# Patient Record
Sex: MALE | HISPANIC OR LATINO | Employment: FULL TIME | URBAN - METROPOLITAN AREA
[De-identification: names, ages, dates, MRNs, and addresses within clinical notes are randomized per-mention and may not be internally consistent; named-entity substitution may affect disease eponyms.]

---

## 2021-11-30 ENCOUNTER — APPOINTMENT (OUTPATIENT)
Dept: RADIOLOGY | Facility: MEDICAL CENTER | Age: 51
End: 2021-11-30
Attending: EMERGENCY MEDICINE
Payer: COMMERCIAL

## 2021-11-30 ENCOUNTER — HOSPITAL ENCOUNTER (EMERGENCY)
Facility: MEDICAL CENTER | Age: 51
End: 2021-11-30
Attending: EMERGENCY MEDICINE
Payer: COMMERCIAL

## 2021-11-30 VITALS
BODY MASS INDEX: 28.57 KG/M2 | DIASTOLIC BLOOD PRESSURE: 67 MMHG | SYSTOLIC BLOOD PRESSURE: 136 MMHG | RESPIRATION RATE: 18 BRPM | HEIGHT: 68 IN | TEMPERATURE: 98 F | WEIGHT: 188.49 LBS | OXYGEN SATURATION: 96 % | HEART RATE: 107 BPM

## 2021-11-30 DIAGNOSIS — R11.2 NAUSEA VOMITING AND DIARRHEA: ICD-10-CM

## 2021-11-30 DIAGNOSIS — E11.65 CONTROLLED TYPE 2 DIABETES MELLITUS WITH HYPERGLYCEMIA, WITHOUT LONG-TERM CURRENT USE OF INSULIN (HCC): ICD-10-CM

## 2021-11-30 DIAGNOSIS — E86.0 DEHYDRATION: ICD-10-CM

## 2021-11-30 DIAGNOSIS — R19.7 NAUSEA VOMITING AND DIARRHEA: ICD-10-CM

## 2021-11-30 DIAGNOSIS — K52.9 ENTERITIS: ICD-10-CM

## 2021-11-30 LAB
ALBUMIN SERPL BCP-MCNC: 4.8 G/DL (ref 3.2–4.9)
ALBUMIN/GLOB SERPL: 1.6 G/DL
ALP SERPL-CCNC: 77 U/L (ref 30–99)
ALT SERPL-CCNC: 33 U/L (ref 2–50)
ANION GAP SERPL CALC-SCNC: 21 MMOL/L (ref 7–16)
APPEARANCE UR: CLEAR
AST SERPL-CCNC: 28 U/L (ref 12–45)
BACTERIA #/AREA URNS HPF: ABNORMAL /HPF
BASE EXCESS BLDV CALC-SCNC: -5 MMOL/L
BASOPHILS # BLD AUTO: 0.3 % (ref 0–1.8)
BASOPHILS # BLD: 0.02 K/UL (ref 0–0.12)
BILIRUB SERPL-MCNC: 0.8 MG/DL (ref 0.1–1.5)
BILIRUB UR QL STRIP.AUTO: NEGATIVE
BODY TEMPERATURE: ABNORMAL CENTIGRADE
BUN SERPL-MCNC: 18 MG/DL (ref 8–22)
CALCIUM SERPL-MCNC: 9.2 MG/DL (ref 8.4–10.2)
CHLORIDE SERPL-SCNC: 102 MMOL/L (ref 96–112)
CO2 SERPL-SCNC: 15 MMOL/L (ref 20–33)
COLOR UR: YELLOW
CREAT SERPL-MCNC: 0.83 MG/DL (ref 0.5–1.4)
EKG IMPRESSION: NORMAL
EOSINOPHIL # BLD AUTO: 0.03 K/UL (ref 0–0.51)
EOSINOPHIL NFR BLD: 0.4 % (ref 0–6.9)
EPI CELLS #/AREA URNS HPF: ABNORMAL /HPF
ERYTHROCYTE [DISTWIDTH] IN BLOOD BY AUTOMATED COUNT: 39.4 FL (ref 35.9–50)
GLOBULIN SER CALC-MCNC: 3 G/DL (ref 1.9–3.5)
GLUCOSE SERPL-MCNC: 199 MG/DL (ref 65–99)
GLUCOSE UR STRIP.AUTO-MCNC: >=1000 MG/DL
HCO3 BLDV-SCNC: 18 MMOL/L (ref 24–28)
HCT VFR BLD AUTO: 54.4 % (ref 42–52)
HGB BLD-MCNC: 18.7 G/DL (ref 14–18)
HYALINE CASTS #/AREA URNS LPF: ABNORMAL /LPF
IMM GRANULOCYTES # BLD AUTO: 0.03 K/UL (ref 0–0.11)
IMM GRANULOCYTES NFR BLD AUTO: 0.4 % (ref 0–0.9)
KETONES UR STRIP.AUTO-MCNC: NEGATIVE MG/DL
LACTATE BLD-SCNC: 1.9 MMOL/L (ref 0.5–2)
LEUKOCYTE ESTERASE UR QL STRIP.AUTO: NEGATIVE
LIPASE SERPL-CCNC: 25 U/L (ref 7–58)
LYMPHOCYTES # BLD AUTO: 0.71 K/UL (ref 1–4.8)
LYMPHOCYTES NFR BLD: 10.4 % (ref 22–41)
MCH RBC QN AUTO: 29.9 PG (ref 27–33)
MCHC RBC AUTO-ENTMCNC: 34.4 G/DL (ref 33.7–35.3)
MCV RBC AUTO: 87 FL (ref 81.4–97.8)
MICRO URNS: ABNORMAL
MONOCYTES # BLD AUTO: 0.12 K/UL (ref 0–0.85)
MONOCYTES NFR BLD AUTO: 1.8 % (ref 0–13.4)
MUCOUS THREADS #/AREA URNS HPF: ABNORMAL /HPF
NEUTROPHILS # BLD AUTO: 5.91 K/UL (ref 1.82–7.42)
NEUTROPHILS NFR BLD: 86.7 % (ref 44–72)
NITRITE UR QL STRIP.AUTO: NEGATIVE
NRBC # BLD AUTO: 0 K/UL
NRBC BLD-RTO: 0 /100 WBC
PCO2 BLDV: 31.2 MMHG (ref 41–51)
PH BLDV: 7.39 [PH] (ref 7.31–7.45)
PH UR STRIP.AUTO: 5 [PH] (ref 5–8)
PLATELET # BLD AUTO: 179 K/UL (ref 164–446)
PMV BLD AUTO: 11.2 FL (ref 9–12.9)
PO2 BLDV: 54.2 MMHG (ref 25–40)
POTASSIUM SERPL-SCNC: 4.8 MMOL/L (ref 3.6–5.5)
PROT SERPL-MCNC: 7.8 G/DL (ref 6–8.2)
PROT UR QL STRIP: 100 MG/DL
RBC # BLD AUTO: 6.25 M/UL (ref 4.7–6.1)
RBC # URNS HPF: ABNORMAL /HPF
RBC UR QL AUTO: ABNORMAL
SAO2 % BLDV: 89.2 %
SODIUM SERPL-SCNC: 138 MMOL/L (ref 135–145)
SP GR UR STRIP.AUTO: >=1.03
TROPONIN T SERPL-MCNC: 10 NG/L (ref 6–19)
WBC # BLD AUTO: 6.8 K/UL (ref 4.8–10.8)
WBC #/AREA URNS HPF: ABNORMAL /HPF

## 2021-11-30 PROCEDURE — 36415 COLL VENOUS BLD VENIPUNCTURE: CPT

## 2021-11-30 PROCEDURE — 99284 EMERGENCY DEPT VISIT MOD MDM: CPT

## 2021-11-30 PROCEDURE — 85025 COMPLETE CBC W/AUTO DIFF WBC: CPT

## 2021-11-30 PROCEDURE — 82803 BLOOD GASES ANY COMBINATION: CPT

## 2021-11-30 PROCEDURE — 96375 TX/PRO/DX INJ NEW DRUG ADDON: CPT

## 2021-11-30 PROCEDURE — 74177 CT ABD & PELVIS W/CONTRAST: CPT

## 2021-11-30 PROCEDURE — 700117 HCHG RX CONTRAST REV CODE 255: Performed by: EMERGENCY MEDICINE

## 2021-11-30 PROCEDURE — 83690 ASSAY OF LIPASE: CPT

## 2021-11-30 PROCEDURE — 93005 ELECTROCARDIOGRAM TRACING: CPT

## 2021-11-30 PROCEDURE — 96374 THER/PROPH/DIAG INJ IV PUSH: CPT

## 2021-11-30 PROCEDURE — 81001 URINALYSIS AUTO W/SCOPE: CPT

## 2021-11-30 PROCEDURE — 83605 ASSAY OF LACTIC ACID: CPT

## 2021-11-30 PROCEDURE — 80053 COMPREHEN METABOLIC PANEL: CPT

## 2021-11-30 PROCEDURE — 96372 THER/PROPH/DIAG INJ SC/IM: CPT

## 2021-11-30 PROCEDURE — 84484 ASSAY OF TROPONIN QUANT: CPT

## 2021-11-30 PROCEDURE — 700105 HCHG RX REV CODE 258: Performed by: EMERGENCY MEDICINE

## 2021-11-30 PROCEDURE — 93005 ELECTROCARDIOGRAM TRACING: CPT | Performed by: EMERGENCY MEDICINE

## 2021-11-30 PROCEDURE — 700111 HCHG RX REV CODE 636 W/ 250 OVERRIDE (IP): Performed by: EMERGENCY MEDICINE

## 2021-11-30 RX ORDER — ONDANSETRON 4 MG/1
4 TABLET, ORALLY DISINTEGRATING ORAL EVERY 6 HOURS PRN
Qty: 10 TABLET | Refills: 0 | Status: SHIPPED | OUTPATIENT
Start: 2021-11-30

## 2021-11-30 RX ORDER — DICYCLOMINE HCL 20 MG
20 TABLET ORAL EVERY 6 HOURS PRN
Qty: 10 TABLET | Refills: 0 | Status: SHIPPED | OUTPATIENT
Start: 2021-11-30

## 2021-11-30 RX ORDER — DICYCLOMINE HYDROCHLORIDE 10 MG/ML
20 INJECTION INTRAMUSCULAR ONCE
Status: COMPLETED | OUTPATIENT
Start: 2021-11-30 | End: 2021-11-30

## 2021-11-30 RX ORDER — SODIUM CHLORIDE, SODIUM LACTATE, POTASSIUM CHLORIDE, CALCIUM CHLORIDE 600; 310; 30; 20 MG/100ML; MG/100ML; MG/100ML; MG/100ML
1000 INJECTION, SOLUTION INTRAVENOUS ONCE
Status: DISCONTINUED | OUTPATIENT
Start: 2021-11-30 | End: 2021-11-30

## 2021-11-30 RX ORDER — SODIUM CHLORIDE, SODIUM LACTATE, POTASSIUM CHLORIDE, CALCIUM CHLORIDE 600; 310; 30; 20 MG/100ML; MG/100ML; MG/100ML; MG/100ML
1000 INJECTION, SOLUTION INTRAVENOUS ONCE
Status: COMPLETED | OUTPATIENT
Start: 2021-11-30 | End: 2021-11-30

## 2021-11-30 RX ORDER — ONDANSETRON 2 MG/ML
4 INJECTION INTRAMUSCULAR; INTRAVENOUS ONCE
Status: COMPLETED | OUTPATIENT
Start: 2021-11-30 | End: 2021-11-30

## 2021-11-30 RX ADMIN — IOHEXOL 100 ML: 350 INJECTION, SOLUTION INTRAVENOUS at 04:59

## 2021-11-30 RX ADMIN — SODIUM CHLORIDE, POTASSIUM CHLORIDE, SODIUM LACTATE AND CALCIUM CHLORIDE 1000 ML: 600; 310; 30; 20 INJECTION, SOLUTION INTRAVENOUS at 03:58

## 2021-11-30 RX ADMIN — FAMOTIDINE 20 MG: 10 INJECTION INTRAVENOUS at 03:58

## 2021-11-30 RX ADMIN — DICYCLOMINE HYDROCHLORIDE 20 MG: 20 INJECTION, SOLUTION INTRAMUSCULAR at 03:57

## 2021-11-30 RX ADMIN — ONDANSETRON 4 MG: 2 INJECTION INTRAMUSCULAR; INTRAVENOUS at 03:58

## 2021-11-30 ASSESSMENT — PAIN DESCRIPTION - PAIN TYPE: TYPE: ACUTE PAIN

## 2021-11-30 NOTE — ED NOTES
IV DC'd w/ tip intact.  Written DC instructions discussed w/ pt and his spouse; questions answered; understanding verbalized.  Pt ambulatory to exit w/ steady gait.

## 2021-11-30 NOTE — ED PROVIDER NOTES
"ED Provider Note    ED Provider Note    Scribed for Antony Calero MD by Antony Calero M.D.. 11/30/2021, 3:40 AM.    Primary care provider: Alli Zuñiga D.O.  Means of arrival: Private  History obtained from: Patient and family  History limited by: None    CHIEF COMPLAINT  Chief Complaint   Patient presents with   • Diarrhea     Patient walk-in with family. Per pt, ate dinner at approx 17:30 and 3 hours later had sudden onset nausea, vomiting & diarrhea. Pt vomited x1 in waiting room and x1 PTA.   • N/V       HPI  Bao Pires is a 51 y.o. male who presents to the Emergency Department for evaluation of nausea, vomiting, and diarrhea.  Patient has history of type 2 diabetes mellitus as well as previous myocardial infarction when he was in his 40s.  He relates 3 hours after eating dinner this evening, starting at approximately 930 he began to have sudden onset of severe nausea with multiple episodes of vomiting and diarrhea.  Describes watery-like emesis and bowel movement, no blood noted in stool.  He has vomited now twice but notes \"constant \"watery diarrheal stool.  Denies any abdominal pain, does describe a subjective fever including chills but is currently afebrile.  He is diabetic but tells me does not take insulin, has had no major surgeries in the abdomen though does note he has had a tonsillectomy.  No dysuria nor hematuria, given severity of symptoms he came to be assessed.    REVIEW OF SYSTEMS  Pertinent positives include loose watery diarrheal stools, nausea, vomiting, poor oral intake secondary to above, chills and tactile fever. Pertinent negatives include no abdominal pain, no chest pain, no dyspnea, no particular ill contacts.  All other systems reviewed and negative.    PAST MEDICAL HISTORY   has a past medical history of Anxiety, Diabetes (HCC), Hyperlipidemia, Hypertension, Hypertriglyceridemia, and Palpitations.    SURGICAL HISTORY   has a past surgical history that " "includes tonsillectomy.    SOCIAL HISTORY  Social History     Tobacco Use   • Smoking status: Former Smoker     Packs/day: 3.00     Years: 65.00     Pack years: 195.00   • Smokeless tobacco: Never Used   Vaping Use   • Vaping Use: Never used   Substance Use Topics   • Alcohol use: No   • Drug use: No      Social History     Substance and Sexual Activity   Drug Use No       FAMILY HISTORY  Noncontributory    CURRENT MEDICATIONS  Home Medications     Reviewed by Angela Menjivar R.N. (Registered Nurse) on 11/30/21 at 0259  Med List Status: Not Addressed   Medication Last Dose Status   lisinopril (PRINIVIL) 20 MG TABS  Active                ALLERGIES  No Known Allergies    PHYSICAL EXAM  VITAL SIGNS: BP (!) 167/121   Pulse (!) 153   Temp 36.9 °C (98.5 °F) (Temporal)   Resp 18   Ht 1.727 m (5' 8\")   Wt 85.5 kg (188 lb 7.9 oz)   SpO2 95%   BMI 28.66 kg/m²     General: Alert, mild acute distress  Skin: Warm, dry, normal for ethnicity  Head: Normocephalic, atraumatic  Neck: Trachea midline, no tenderness  Eye: PERRL, normal conjunctiva  ENMT: Oral mucosa pink and very dry, no pharyngeal erythema or exudate  Cardiovascular: S1, S2, significantly tachycardic, otherwise regular rate and rhythm, No murmur, Normal peripheral perfusion  Respiratory: Lungs CTA, respirations are non-labored, breath sounds are equal  Gastrointestinal: Soft, nontender, non distended.  Bowel sounds are very hypoactive.  No guarding, no rebound, no rigidity.  Musculoskeletal: No swelling, no deformity  Neurological: Alert and oriented to person, place, time, and situation  Lymphatics: No lymphadenopathy  Psychiatric: Cooperative, appropriate mood & affect      DIAGNOSTIC STUDIES/PROCEDURES    LABS  Results for orders placed or performed during the hospital encounter of 11/30/21   CBC WITH DIFFERENTIAL   Result Value Ref Range    WBC 6.8 4.8 - 10.8 K/uL    RBC 6.25 (H) 4.70 - 6.10 M/uL    Hemoglobin 18.7 (H) 14.0 - 18.0 g/dL    Hematocrit 54.4 (H) " 42.0 - 52.0 %    MCV 87.0 81.4 - 97.8 fL    MCH 29.9 27.0 - 33.0 pg    MCHC 34.4 33.7 - 35.3 g/dL    RDW 39.4 35.9 - 50.0 fL    Platelet Count 179 164 - 446 K/uL    MPV 11.2 9.0 - 12.9 fL    Neutrophils-Polys 86.70 (H) 44.00 - 72.00 %    Lymphocytes 10.40 (L) 22.00 - 41.00 %    Monocytes 1.80 0.00 - 13.40 %    Eosinophils 0.40 0.00 - 6.90 %    Basophils 0.30 0.00 - 1.80 %    Immature Granulocytes 0.40 0.00 - 0.90 %    Nucleated RBC 0.00 /100 WBC    Neutrophils (Absolute) 5.91 1.82 - 7.42 K/uL    Lymphs (Absolute) 0.71 (L) 1.00 - 4.80 K/uL    Monos (Absolute) 0.12 0.00 - 0.85 K/uL    Eos (Absolute) 0.03 0.00 - 0.51 K/uL    Baso (Absolute) 0.02 0.00 - 0.12 K/uL    Immature Granulocytes (abs) 0.03 0.00 - 0.11 K/uL    NRBC (Absolute) 0.00 K/uL   COMP METABOLIC PANEL   Result Value Ref Range    Sodium 138 135 - 145 mmol/L    Potassium 4.8 3.6 - 5.5 mmol/L    Chloride 102 96 - 112 mmol/L    Co2 15 (L) 20 - 33 mmol/L    Anion Gap 21.0 (H) 7.0 - 16.0    Glucose 199 (H) 65 - 99 mg/dL    Bun 18 8 - 22 mg/dL    Creatinine 0.83 0.50 - 1.40 mg/dL    Calcium 9.2 8.4 - 10.2 mg/dL    AST(SGOT) 28 12 - 45 U/L    ALT(SGPT) 33 2 - 50 U/L    Alkaline Phosphatase 77 30 - 99 U/L    Total Bilirubin 0.8 0.1 - 1.5 mg/dL    Albumin 4.8 3.2 - 4.9 g/dL    Total Protein 7.8 6.0 - 8.2 g/dL    Globulin 3.0 1.9 - 3.5 g/dL    A-G Ratio 1.6 g/dL   LIPASE   Result Value Ref Range    Lipase 25 7 - 58 U/L   URINALYSIS    Specimen: Urine, Clean Catch   Result Value Ref Range    Color Yellow     Character Clear     Specific Gravity >=1.030 <1.035    Ph 5.0 5.0 - 8.0    Glucose >=1000 (A) Negative mg/dL    Ketones Negative Negative mg/dL    Protein 100 (A) Negative mg/dL    Bilirubin Negative Negative    Nitrite Negative Negative    Leukocyte Esterase Negative Negative    Occult Blood Trace (A) Negative    Micro Urine Req Microscopic    URINE MICROSCOPIC (W/UA)   Result Value Ref Range    WBC 0-2 (A) /hpf    RBC 0-2 (A) /hpf    Bacteria Rare (A) None /hpf     Epithelial Cells Rare Few /hpf    Mucous Threads Few /hpf    Hyaline Cast 0-2 /lpf   ESTIMATED GFR   Result Value Ref Range    GFR If African American >60 >60 mL/min/1.73 m 2    GFR If Non African American >60 >60 mL/min/1.73 m 2   VENOUS BLOOD GAS   Result Value Ref Range    Venous Bg Ph 7.39 7.31 - 7.45    Venous Bg Pco2 31.2 (L) 41.0 - 51.0 mmHg    Venous Bg Po2 54.2 (H) 25.0 - 40.0 mmHg    Venous Bg O2 Saturation 89.2 %    Venous Bg Hco3 18 (L) 24 - 28 mmol/L    Venous Bg Base Excess -5 mmol/L    Body Temp see below Centigrade   LACTIC ACID   Result Value Ref Range    Lactic Acid 1.9 0.5 - 2.0 mmol/L   EKG   Result Value Ref Range    Report       [facility] Emergency Dept.    Test Date:  2021  Pt Name:    HAILE CORTES               Department: [facility]  MRN:        [id number]                      [facility]  Gender:     Male                         Technician: [id number]  :        1970                   Requested By:ER TRIAGE PROTOCOL  Order #:    [id number]                    Reading MD: DASHA LEA MD    Measurements  Intervals                                Axis  Rate:       139                          P:          -22  GA:         80                           QRS:        -162  QRSD:       140                          T:          9  QT:         323  QTc:        491    Interpretive Statements  Sinus tachycardia  RBBB and LPFB  Inferior infarct, old  No previous ECG available for comparison  Electronically Signed On 2021 3:29:52 PST by DASHA LEA MD       All labs reviewed by me, significantly low bicarb, elevated anion gap, all concerning for volume depletion.    EKG  12 Lead EKG obtained at 0316 and interpreted by me to show:  Rhythm: Sinus tachycardia  Rate: 139  Axis: Right  Intervals: Normal  Q Waves: Normal  No diagnostic ST segment elevation  Widened QRS, 140 ms, right bundle branch block, left posterior fascicular block, Q waves in  the inferior leads consistent with old infarct  Clinical Impression: Normal EKG  Compared to none available    RADIOLOGY  CT-ABDOMEN-PELVIS WITH   Final Result         1.  Fluid-filled reactive small bowel loops in the left upper quadrant, appearance compatible with ileus and/or enteritis.   2.  Diverticulosis   3.  Hepatomegaly and diffuse hepatic steatosis.   4.  Splenomegaly   5.  Atherosclerosis and atherosclerotic coronary artery disease        The radiologist's interpretation of all radiological studies have been reviewed by me.    COURSE & MEDICAL DECISION MAKING  Pertinent Labs & Imaging studies reviewed. (See chart for details)    3:40 AM - Patient seen and examined at bedside. Patient will be treated with 2 L of lactated Ringer's, Zofran, dicyclomine, famotidine. Ordered septic/cardiac to evaluate his symptoms. The differential diagnoses include but are not limited to: Gastroenteritis, dehydration, electrolyte abnormality, colitis    0527: Patient reassessed, he is completely 1250 mL of crystalloid thus far.  Feeling much better.  Still quite tachycardic but heart rate is improving, currently 121 bpm sinus tachycardia noted on the monitor.  Nausea and discomfort essentially resolved.  The skin tone is improved with IV fluids.  I have updated him with work-up results thus far which are reassuring.  Anticipate discharge when crystalloid fluid resuscitation is complete.    HYDRATION: Based on the patient's presentation of Acute Diarrhea, Acute Vomiting, Dehydration and Tachycardia the patient was given IV fluids. IV Hydration was used because oral hydration was not as rapid as required. Upon recheck following hydration, the patient was Doing better, heart rate improving currently 121.    HTN/IDDM FOLLOW UP:  The patient is referred to a primary physician for blood pressure management, diabetic screening, and for all other preventive health concerns    Decision Making:  This is a 51 y.o. year old male who  presents with nausea vomiting and diarrhea.  He is obviously volume depleted, dry oral mucous membranes but very tachycardic.  EKG does thankfully demonstrates sinus rhythm, patient has an odd appearing right bundle branch block with what appears to also be a left posterior fascicular block.  Q waves appreciated consistent with an old infarct.  No mark dysrhythmia, patient is obviously very dehydrated, cardiac monitoring is ordered and appropriate fluid resuscitation initiated.  Indeed labs demonstrate significantly hemoconcentrated CBC, elevated anion gap and low serum bicarb consistent with volume depletion.  That being said, given severity of his symptoms, persistent significant sinus tachycardia, and obvious evidence of rather marked volume depletion, in context of history of diabetes mellitus, CT will also be obtained to evaluate further.  Thankfully CT is unrevealing, evidence of enteritis which is certainly compatible with the clinical presentation.  He is feeling much better on my reassessment and is able to tolerate p.o.  No indication for inpatient management at this time.    The patient will return for new or worsening symptoms and is stable at the time of discharge.    Patient has had high blood pressure while in the emergency department, felt likely secondary to medical condition. Counseled patient to monitor blood pressure at home and follow up with primary care physician.      DISPOSITION:  Patient will be discharged home in stable condition.    FOLLOW UP:  Alli Zuñiga, KASSIE.  [address] [id number] [phone]    Schedule an appointment as soon as possible for a visit in 2 days        OUTPATIENT MEDICATIONS:  New Prescriptions    DICYCLOMINE (BENTYL) 20 MG TAB    Take 1 Tablet by mouth every 6 hours as needed (Abdominal Discomfort).    ONDANSETRON (ZOFRAN ODT) 4 MG TABLET DISPERSIBLE    Take 1 Tablet by mouth every 6 hours as needed for Nausea.         FINAL IMPRESSION  1. Dehydration     2. Nausea vomiting and diarrhea    3. Controlled type 2 diabetes mellitus with hyperglycemia, without long-term current use of insulin (HCC)    4. Enteritis          Antony SAWANT M.D. (Scribe), am scribing for, and in the presence of, Antony Calero MD.    Electronically signed by: Antony Calero M.D. (Scribe), 11/30/2021    IAntony MD personally performed the services described in this documentation, as scribed by Antony Calero M.D. in my presence, and it is both accurate and complete    The note accurately reflects work and decisions made by me.  Antony Calero M.D.  11/30/2021  5:30 AM

## 2021-11-30 NOTE — ED NOTES
PIV established, blood tubes collected & sent to lab. UA sample collected & sent to lab.    EKG done & reviewed by MD Calero.

## 2021-11-30 NOTE — ED TRIAGE NOTES
"Chief Complaint   Patient presents with   • Diarrhea     Patient walk-in with family. Per pt, ate dinner at approx 17:30 and 3 hours later had sudden onset nausea, vomiting & diarrhea. Pt vomited x1 in waiting room and x1 PTA.   • N/V     BP (!) 167/121   Pulse (!) 153   Temp 36.9 °C (98.5 °F) (Temporal)   Resp 18   Ht 1.727 m (5' 8\")   Wt 85.5 kg (188 lb 7.9 oz)   SpO2 95% RA    Has this patient been vaccinated for COVID:  YES - Moderna March & April 2021  "

## 2023-09-18 ENCOUNTER — OFFICE VISIT (OUTPATIENT)
Dept: URGENT CARE | Facility: CLINIC | Age: 53
End: 2023-09-18
Payer: COMMERCIAL

## 2023-09-18 VITALS
HEART RATE: 93 BPM | OXYGEN SATURATION: 98 % | BODY MASS INDEX: 29.55 KG/M2 | WEIGHT: 195 LBS | RESPIRATION RATE: 18 BRPM | TEMPERATURE: 97.2 F | SYSTOLIC BLOOD PRESSURE: 100 MMHG | DIASTOLIC BLOOD PRESSURE: 60 MMHG | HEIGHT: 68 IN

## 2023-09-18 DIAGNOSIS — G89.29 CHRONIC PAIN OF BOTH SHOULDERS: ICD-10-CM

## 2023-09-18 DIAGNOSIS — M25.512 CHRONIC PAIN OF BOTH SHOULDERS: ICD-10-CM

## 2023-09-18 DIAGNOSIS — M19.012 ARTHRITIS OF BOTH ACROMIOCLAVICULAR JOINTS: ICD-10-CM

## 2023-09-18 DIAGNOSIS — M25.511 CHRONIC PAIN OF BOTH SHOULDERS: ICD-10-CM

## 2023-09-18 DIAGNOSIS — M65.20 CALCIFIC TENDONITIS: ICD-10-CM

## 2023-09-18 DIAGNOSIS — M19.011 ARTHRITIS OF BOTH ACROMIOCLAVICULAR JOINTS: ICD-10-CM

## 2023-09-18 PROCEDURE — 3078F DIAST BP <80 MM HG: CPT | Performed by: PHYSICIAN ASSISTANT

## 2023-09-18 PROCEDURE — 99204 OFFICE O/P NEW MOD 45 MIN: CPT | Performed by: PHYSICIAN ASSISTANT

## 2023-09-18 PROCEDURE — 3074F SYST BP LT 130 MM HG: CPT | Performed by: PHYSICIAN ASSISTANT

## 2023-09-18 RX ORDER — TIRZEPATIDE 5 MG/.5ML
1 INJECTION, SOLUTION SUBCUTANEOUS
COMMUNITY
Start: 2023-06-21

## 2023-09-18 RX ORDER — TIRZEPATIDE 7.5 MG/.5ML
INJECTION, SOLUTION SUBCUTANEOUS
COMMUNITY
Start: 2023-08-25

## 2023-09-18 RX ORDER — FENOFIBRATE 48 MG/1
48 TABLET, COATED ORAL DAILY
COMMUNITY
Start: 2023-09-02

## 2023-09-18 RX ORDER — TELMISARTAN 80 MG/1
80 TABLET ORAL
COMMUNITY
Start: 2023-09-11

## 2023-09-18 RX ORDER — INSULIN GLARGINE 100 [IU]/ML
INJECTION, SOLUTION SUBCUTANEOUS
COMMUNITY
Start: 2023-09-11

## 2023-09-18 RX ORDER — EMPAGLIFLOZIN, METFORMIN HYDROCHLORIDE 12.5; 1 MG/1; MG/1
1 TABLET, EXTENDED RELEASE ORAL 2 TIMES DAILY
COMMUNITY
Start: 2023-09-02

## 2023-09-18 RX ORDER — EZETIMIBE 10 MG/1
10 TABLET ORAL
COMMUNITY
Start: 2023-09-11

## 2023-09-18 RX ORDER — ROSUVASTATIN CALCIUM 40 MG/1
40 TABLET, COATED ORAL
COMMUNITY
Start: 2023-09-02

## 2023-09-18 RX ORDER — BLOOD-GLUCOSE METER
KIT MISCELLANEOUS
COMMUNITY
Start: 2023-08-17

## 2023-09-18 ASSESSMENT — ENCOUNTER SYMPTOMS
BACK PAIN: 1
CHILLS: 0
FEVER: 0
VOMITING: 0
MYALGIAS: 1
NAUSEA: 0

## 2023-09-18 ASSESSMENT — FIBROSIS 4 INDEX: FIB4 SCORE: 1.47

## 2023-09-18 NOTE — PROGRESS NOTES
Subjective:   Bao Pires is a 52 y.o. male who presents for Shoulder Pain (Shoulder pain on both shoulders,seems like it worse while sleeping, cant seem to hold up, pain has been for 6 months now)        Patient presents with concerns of bilateral shoulder pain for the last 6 months.  Pain is worse when he first wakes up in the morning and is achy.  Symptoms will improve throughout the day as long as he does not use his arms much.  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  No neck pain.  No numbness and tingling in upper extremities.   He denies remote and recent trauma.  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  He is not taking any medications for pain.  Past medical history significant for diabetes.      Review of Systems   Constitutional:  Negative for chills and fever.   Gastrointestinal:  Negative for nausea and vomiting.   Musculoskeletal:  Positive for back pain and myalgias.       PMH:  has a past medical history of Anxiety, Diabetes ([facility]), Hyperlipidemia, Hypertension, Hypertriglyceridemia, MI (myocardial infarction) ([facility]), and Palpitations.  MEDS:   Current Outpatient Medications:     SYNJARDY XR 12.5-1000 MG TABLET SR 24 HR, Take 1 Tablet by mouth 2 times a day., Disp: , Rfl:     ezetimibe (ZETIA) 10 MG Tab, Take 10 mg by mouth at bedtime., Disp: , Rfl:     fenofibrate (TRICOR) 48 MG Tab, Take 48 mg by mouth every day., Disp: , Rfl:     FREESTYLE LITE strip, USE STRIP TO CHECK GLUCOSE TWICE DAILY, Disp: , Rfl:     INSULIN GLARGINE 100 UNIT/ML injection PEN, INJECT 10 UNITS SUBCUTANEOUSLY ONCE DAILY AT BEDTIME, Disp: , Rfl:     rosuvastatin (CRESTOR) 40 MG tablet, Take 40 mg by mouth at bedtime., Disp: , Rfl:     telmisartan (MICARDIS) 80 MG Tab, Take 80 mg by mouth at bedtime., Disp: , Rfl:     MOUNJARO 5 MG/0.5ML Solution Pen-injector, Inject 1 Syringe under the skin every 7 days., Disp: , Rfl:     MOUNJARO 7.5 MG/0.5ML  "Solution Pen-injector, INJECT 7.5MG SUBCUTANEOUSLY ONCE A WEEK, Disp: , Rfl:     ondansetron (ZOFRAN ODT) 4 MG TABLET DISPERSIBLE, Take 1 Tablet by mouth every 6 hours as needed for Nausea., Disp: 10 Tablet, Rfl: 0    dicyclomine (BENTYL) 20 MG Tab, Take 1 Tablet by mouth every 6 hours as needed (Abdominal Discomfort)., Disp: 10 Tablet, Rfl: 0    lisinopril (PRINIVIL) 20 MG TABS, Take 1 Tab by mouth every day., Disp: 30 Each, Rfl: 0  ALLERGIES: No Known Allergies  SURGHX:   Past Surgical History:   Procedure Laterality Date    TONSILLECTOMY      At 7 years old     SOCHX:  reports that he has quit smoking. He has a 195.0 pack-year smoking history. He has never used smokeless tobacco. He reports that he does not drink alcohol and does not use drugs.  FH: Family history was reviewed, no pertinent findings to report   Objective:   /60 (BP Location: Left arm, Patient Position: Sitting, BP Cuff Size: Adult)   Pulse 93   Temp 36.2 °C (97.2 °F) (Temporal)   Resp 18   Ht 1.727 m (5' 8\")   Wt 88.5 kg (195 lb)   SpO2 98%   BMI 29.65 kg/m²   Physical Exam  Vitals reviewed.   Constitutional:       General: He is not in acute distress.     Appearance: Normal appearance. He is well-developed. He is not toxic-appearing.   HENT:      Head: Normocephalic and atraumatic.      Right Ear: External ear normal.      Left Ear: External ear normal.      Nose: Nose normal.   Cardiovascular:      Rate and Rhythm: Normal rate and regular rhythm.   Pulmonary:      Effort: Pulmonary effort is normal. No respiratory distress.      Breath sounds: No stridor.   Musculoskeletal:      Comments: Right shoulder: No gross deformities.  No bony or muscular tenderness with palpation.  Flexion to 180, extension to 40.  Abduction to 100 (pain with this), adduction to 30.  External rotation to 50 (pain at the limits of this) and internal rotation to 40.  Negative empty can.  Negative cross body test.    Left Shoulder:No gross deformities.  No " bony or muscular tenderness with palpation.  Flexion to 180, extension to 40.  Abduction to 100 (pain with this), adduction to 30 (pain with this).  External rotation to 50 (pain at the limits of this) and internal rotation to 40.  Negative empty can.  Positive cross body test.    Upper extremity sensation intact and even bilaterally.  Radial pulses 2+.     Skin:     General: Skin is dry.   Neurological:      Comments: Alert and oriented.    Psychiatric:         Speech: Speech normal.         Behavior: Behavior normal.           Assessment/Plan:   1. Chronic pain of both shoulders  - DX-SHOULDER 2+ RIGHT; Future  - DX-SHOULDER 2+ LEFT; Future    Other orders  - SYNJARDY XR 12.5-1000 MG TABLET SR 24 HR; Take 1 Tablet by mouth 2 times a day.  - ezetimibe (ZETIA) 10 MG Tab; Take 10 mg by mouth at bedtime.  - fenofibrate (TRICOR) 48 MG Tab; Take 48 mg by mouth every day.  - FREESTYLE LITE strip; USE STRIP TO CHECK GLUCOSE TWICE DAILY  - INSULIN GLARGINE 100 UNIT/ML injection PEN; INJECT 10 UNITS SUBCUTANEOUSLY ONCE DAILY AT BEDTIME  - rosuvastatin (CRESTOR) 40 MG tablet; Take 40 mg by mouth at bedtime.  - telmisartan (MICARDIS) 80 MG Tab; Take 80 mg by mouth at bedtime.  - MOUNJARO 5 MG/0.5ML Solution Pen-injector; Inject 1 Syringe under the skin every 7 days.  - MOUNJARO 7.5 MG/0.5ML Solution Pen-injector; INJECT 7.5MG SUBCUTANEOUSLY ONCE A WEEK    Recommend imaging to further evaluate.  Patient will return to clinic next week to have this done.  Discussed trialing patient on an anti-inflammatory-she declines at this time. I anticipate referring patient to sports med for reevaluation and further management.

## 2023-09-26 ENCOUNTER — APPOINTMENT (OUTPATIENT)
Dept: RADIOLOGY | Facility: MEDICAL CENTER | Age: 53
End: 2023-09-26
Attending: PHYSICIAN ASSISTANT
Payer: COMMERCIAL

## 2023-09-26 DIAGNOSIS — M25.511 CHRONIC PAIN OF BOTH SHOULDERS: ICD-10-CM

## 2023-09-26 DIAGNOSIS — G89.29 CHRONIC PAIN OF BOTH SHOULDERS: ICD-10-CM

## 2023-09-26 DIAGNOSIS — M25.512 CHRONIC PAIN OF BOTH SHOULDERS: ICD-10-CM

## 2023-09-26 PROCEDURE — 73030 X-RAY EXAM OF SHOULDER: CPT | Mod: RT

## 2023-09-26 PROCEDURE — 73030 X-RAY EXAM OF SHOULDER: CPT | Mod: LT

## 2023-09-26 NOTE — RESULT ENCOUNTER NOTE
Cordell Gore,    Your shoulder imaging shows arthritis in your shoulder as well as tendinitis. I am going to put in a referral to have you follow up with our sports med doc, Dr. Sweeney, to talk about treatment options. His officer should call you in the next week to schedule this appointment.    Kind regards,  Jonny

## 2023-10-16 ENCOUNTER — OFFICE VISIT (OUTPATIENT)
Dept: SPORTS MEDICINE | Facility: CLINIC | Age: 53
End: 2023-10-16
Attending: PHYSICIAN ASSISTANT
Payer: COMMERCIAL

## 2023-10-16 VITALS
RESPIRATION RATE: 16 BRPM | HEART RATE: 90 BPM | SYSTOLIC BLOOD PRESSURE: 118 MMHG | DIASTOLIC BLOOD PRESSURE: 78 MMHG | WEIGHT: 195 LBS | OXYGEN SATURATION: 98 % | BODY MASS INDEX: 29.55 KG/M2 | TEMPERATURE: 97.7 F | HEIGHT: 68 IN

## 2023-10-16 DIAGNOSIS — M75.31 CALCIFIC TENDINITIS OF BOTH SHOULDER REGIONS: ICD-10-CM

## 2023-10-16 DIAGNOSIS — M75.32 CALCIFIC TENDINITIS OF BOTH SHOULDER REGIONS: ICD-10-CM

## 2023-10-16 DIAGNOSIS — M75.02 ADHESIVE CAPSULITIS OF LEFT SHOULDER: ICD-10-CM

## 2023-10-16 PROCEDURE — 99214 OFFICE O/P EST MOD 30 MIN: CPT | Mod: 25 | Performed by: FAMILY MEDICINE

## 2023-10-16 PROCEDURE — 3078F DIAST BP <80 MM HG: CPT | Performed by: FAMILY MEDICINE

## 2023-10-16 PROCEDURE — 20610 DRAIN/INJ JOINT/BURSA W/O US: CPT | Mod: 50 | Performed by: FAMILY MEDICINE

## 2023-10-16 PROCEDURE — 3074F SYST BP LT 130 MM HG: CPT | Performed by: FAMILY MEDICINE

## 2023-10-16 RX ORDER — TRIAMCINOLONE ACETONIDE 40 MG/ML
40 INJECTION, SUSPENSION INTRA-ARTICULAR; INTRAMUSCULAR ONCE
Status: COMPLETED | OUTPATIENT
Start: 2023-10-16 | End: 2023-10-16

## 2023-10-16 RX ADMIN — TRIAMCINOLONE ACETONIDE 40 MG: 40 INJECTION, SUSPENSION INTRA-ARTICULAR; INTRAMUSCULAR at 08:55

## 2023-10-16 RX ADMIN — TRIAMCINOLONE ACETONIDE 40 MG: 40 INJECTION, SUSPENSION INTRA-ARTICULAR; INTRAMUSCULAR at 08:54

## 2023-10-16 ASSESSMENT — FIBROSIS 4 INDEX: FIB4 SCORE: 1.5

## 2023-10-16 NOTE — PROCEDURES
PROCEDURE NOTE:  right Shoulder subacromial injection  Risks and benefits discussed  Informed consent obtained  Shoulder prepped in sterile fashion utilizing a posterior approach  40 mg of Kenalog and 5 cc of bupivacaine injected into the subacromial space  Vapocoolant spray was utilized  Patient tolerated the procedure well  Postprocedure care and red flags discussed      PROCEDURE NOTE:  left Shoulder glenohumeral injection  Risks and benefits discussed  Informed consent obtained  Shoulder prepped in sterile fashion utilizing a posterior approach  40 mg of Kenalog and 5 cc of bupivacaine injected into the glenohumeral space  Vapocoolant spray was utilized  Patient tolerated the procedure well  Postprocedure care and red flags discussed

## 2023-10-16 NOTE — Clinical Note
Cordell Fuller, Thank you for referring Bao to the sports medicine clinic.  We did provide him with some shoulder injections, exercises and referred him for some formal therapy. We plan on seeing him back in a month see how things are coming along.  Hope you are well! Respectfully,  MOSHE Sweeney M.D. [facility] [phone]

## 2023-10-16 NOTE — PROGRESS NOTES
Chief Complaint   Patient presents with    Shoulder Pain     Bilateral shoulder pain      CHIEF COMPLAINT:  Bao Pires male presenting at the request of Jonny Rivero P.A.-C.   for evaluation of Shoulder pain.   Bao Pires is complaining of bilateral shoulder pain  present for 3 months  Insidious without injury  Worse in AM and overhead lifting    Pain is at the superior  Quality is aching,  dull  Pain is Non-radiating  Aggravated by overhead activity  Improved with  rest and after some activity  no prior problems with this shoulder in the past  Prior Treatments:  Seen in urgent care  Prior studies: X-Ray   Medications tried for pain include: ibuprofen (OTC) does not really help much  Mechanical Symptom history: No Locking    Construction, glass/window installation  Fairly sedentary otherwise    REVIEW OF SYSTEMS  No Nausea, No Vomiting, No Chest Pain, No Shortness of Breath, No Dizziness, No Headache    PAST MEDICAL HISTORY:   History reviewed. No pertinent past medical history.    PMH:  has a past medical history of Anxiety, Diabetes (HCC), Hyperlipidemia, Hypertension, Hypertriglyceridemia, MI (myocardial infarction) ([facility]), and Palpitations.  MEDS:   Current Outpatient Medications:     SYNJARDY XR 12.5-1000 MG TABLET SR 24 HR, Take 1 Tablet by mouth 2 times a day., Disp: , Rfl:     ezetimibe (ZETIA) 10 MG Tab, Take 10 mg by mouth at bedtime., Disp: , Rfl:     fenofibrate (TRICOR) 48 MG Tab, Take 48 mg by mouth every day., Disp: , Rfl:     FREESTYLE LITE strip, USE STRIP TO CHECK GLUCOSE TWICE DAILY, Disp: , Rfl:     INSULIN GLARGINE 100 UNIT/ML injection PEN, INJECT 10 UNITS SUBCUTANEOUSLY ONCE DAILY AT BEDTIME, Disp: , Rfl:     rosuvastatin (CRESTOR) 40 MG tablet, Take 40 mg by mouth at bedtime., Disp: , Rfl:     telmisartan (MICARDIS) 80 MG Tab, Take 80 mg by mouth at bedtime., Disp: , Rfl:     MOUNJARO 5 MG/0.5ML Solution Pen-injector, Inject 1 Syringe under the skin every 7 days., Disp: , Rfl:  "    MOUNJARO 7.5 MG/0.5ML Solution Pen-injector, INJECT 7.5MG SUBCUTANEOUSLY ONCE A WEEK, Disp: , Rfl:     ondansetron (ZOFRAN ODT) 4 MG TABLET DISPERSIBLE, Take 1 Tablet by mouth every 6 hours as needed for Nausea., Disp: 10 Tablet, Rfl: 0    dicyclomine (BENTYL) 20 MG Tab, Take 1 Tablet by mouth every 6 hours as needed (Abdominal Discomfort)., Disp: 10 Tablet, Rfl: 0    lisinopril (PRINIVIL) 20 MG TABS, Take 1 Tab by mouth every day., Disp: 30 Each, Rfl: 0  ALLERGIES: No Known Allergies  SURGHX:   Past Surgical History:   Procedure Laterality Date    TONSILLECTOMY      At 7 years old     SOCHX:  reports that he has quit smoking. His smoking use included cigarettes. He has a 195.0 pack-year smoking history. He has never used smokeless tobacco. He reports that he does not drink alcohol and does not use drugs.  FH: Family history was reviewed, no pertinent findings to report     PHYSICAL EXAM:  /78 (BP Location: Left arm, Patient Position: Sitting, BP Cuff Size: Adult)   Pulse 90   Temp 36.5 °C (97.7 °F) (Temporal)   Resp 16   Ht 1.727 m (5' 8\")   Wt 88.5 kg (195 lb)   SpO2 98%   BMI 29.65 kg/m²      well-developed, well-nourished in no apparent distress, alert and oriented x 3.  Gait: normal    Cervical spine:  Range of motion Slightly limited with Lateral rotation  Spurling's testing is NEGATIVE but there is some local cervical spine discomfort and some mild pain into the LEFT shoulder  Cervical spine tenderness NEGATIVE    Strength testing:     Deltoid, bilateral 5/5  Bicep, bilateral 5/5  Tricep, bilateral 5/5  Wrist Extension, bilateral 5/5  Wrist Flexion, bilateral 5/5  Finger Abduction, bilateral 5/5    Sensation:  INTACT Bilaterally        Reflexes:   Biceps: R 2+/L 2+  Triceps: R 2+/L  2+  Brachial radialis R 2+/L  2+  Sanchez's testing is NEGATIVE  The arms are otherwise neurovascularly intact     Shoulder Exam:    RIGHT Shoulder:  No visible swelling   Range of motion DIMINISHED   Tenderness: " Infraspinatus tenderness  Empty Can Testing 5/5  Internal Rotation 5/5  External Rotation 5/5  Lift Off Testing 5/5  Impingement testing Ramirez  POSITIVE  Neer's testing POSITIVE  Apprehension testing POSITIVE    LEFT Shoulder:  No visible swelling   Range of motion DIMINISHED with pain  Tenderness: Infraspinatus tenderness  Empty Can Testing 5/5  Internal Rotation 5/5  External Rotation 5/5  Lift Off Testing 5/5  Impingement testing Ramirez  POSITIVE  Neer's testing POSITIVE  Apprehension testing POSITIVE    Additional Findings: Flexed Posture      1. Calcific tendinitis of both shoulder regions  triamcinolone acetonide (Kenalog-40) injection 40 mg    triamcinolone acetonide (Kenalog-40) injection 40 mg    Referral to Physical Therapy      2. Adhesive capsulitis of left shoulder  triamcinolone acetonide (Kenalog-40) injection 40 mg    Referral to Physical Therapy        Bao Pires is complaining of bilateral shoulder pain  present for 3 months  Insidious without injury  Worse in AM and overhead lifting    RIGHT subacromial corticosteroid injection performed the office TODAY (October 16, 2023)    LEFT shoulder glenohumeral corticosteroid injection performed the office TODAY (October 16, 2023)    Provided with shoulder HEP  Referral for formal physical therapy ([facility] location)    Return in about 4 weeks (around 11/13/2023).  Is doing after RIGHT subacromial and LEFT glenohumeral corticosteroid injections        9/26/2023 9:44 AM     HISTORY/REASON FOR EXAM:  Atraumatic Pain/Swelling/Deformity.  Right shoulder pain     TECHNIQUE/EXAM DESCRIPTION AND NUMBER OF VIEWS:  3 views of the RIGHT shoulder.     COMPARISON: None     FINDINGS:  There is acromioclavicular joint osteoarthritis.     The glenohumeral joint is normal in appearance.     There is calcification peripheral to the humeral head.     There are no fracture or malalignment.     The visualized lung is clear.     IMPRESSION:     1.  Right  acromioclavicular joint osteoarthritis     2.  Calcification peripheral to the right humeral head which could be hydroxyapatite deposition or calcific tendinitis           Exam Ended: 09/26/23  9:56 AM Last Resulted: 09/26/23 10:29 AM                    9/26/2023 9:44 AM     HISTORY/REASON FOR EXAM:  Atraumatic Pain/Swelling/Deformity.  Left shoulder pain     TECHNIQUE/EXAM DESCRIPTION AND NUMBER OF VIEWS:  3 views of the LEFT shoulder.     COMPARISON: None     FINDINGS:  There is acromioclavicular joint osteoarthritis.     The glenohumeral joint is normal in appearance.     There are no fracture or malalignment.     There is calcification lateral to the humeral head.     The visualized lung is clear.     IMPRESSION:     1.  Left acromioclavicular joint osteoarthritis     2.  Calcification peripheral to the humeral head which could be calcific tendinitis or hydroxyapatite deposition           Exam Ended: 09/26/23  9:55 AM Last Resulted: 09/26/23 10:29 AM           Interpreted in the office today with the patient    Thank you Jonny Rivero P.A.-C.  for allowing me to participate in caring for your patient.

## 2023-11-20 ENCOUNTER — OFFICE VISIT (OUTPATIENT)
Dept: SPORTS MEDICINE | Facility: CLINIC | Age: 53
End: 2023-11-20
Payer: COMMERCIAL

## 2023-11-20 VITALS
BODY MASS INDEX: 29.55 KG/M2 | HEIGHT: 68 IN | OXYGEN SATURATION: 97 % | WEIGHT: 195 LBS | DIASTOLIC BLOOD PRESSURE: 94 MMHG | SYSTOLIC BLOOD PRESSURE: 142 MMHG | RESPIRATION RATE: 18 BRPM | HEART RATE: 96 BPM | TEMPERATURE: 97.9 F

## 2023-11-20 DIAGNOSIS — M75.02 ADHESIVE CAPSULITIS OF LEFT SHOULDER: ICD-10-CM

## 2023-11-20 DIAGNOSIS — M75.32 CALCIFIC TENDINITIS OF BOTH SHOULDER REGIONS: ICD-10-CM

## 2023-11-20 DIAGNOSIS — M75.31 CALCIFIC TENDINITIS OF BOTH SHOULDER REGIONS: ICD-10-CM

## 2023-11-20 PROCEDURE — 3077F SYST BP >= 140 MM HG: CPT | Performed by: FAMILY MEDICINE

## 2023-11-20 PROCEDURE — 3080F DIAST BP >= 90 MM HG: CPT | Performed by: FAMILY MEDICINE

## 2023-11-20 PROCEDURE — 99213 OFFICE O/P EST LOW 20 MIN: CPT | Performed by: FAMILY MEDICINE

## 2023-11-20 ASSESSMENT — FIBROSIS 4 INDEX: FIB4 SCORE: 1.5

## 2023-11-20 NOTE — Clinical Note
Cordell Fuller, We saw Everardo courtney for his bilateral shoulder pain.  Fortunately is MUCH BETTER.  He is very happy with his results from his bilateral corticosteroid injections.  He has been encouraged to continue his exercises and he can follow-up with us on an as-needed basis. Hope you are well! Respectfully,  MOSHE Sweeney M.D. [facility] [place] [phone]

## 2023-11-20 NOTE — PROGRESS NOTES
"Chief Complaint   Patient presents with    Shoulder Pain     Bilateral shoulder pain      CHIEF COMPLAINT:  Bao Pires male presenting at the request of Jonny Rivero P.A.-C.   for evaluation of Shoulder pain.   Bao Pires is complaining of bilateral shoulder pain  present for 3 months  Insidious without injury  Worse in AM and overhead lifting    Pain is at the superior  Quality is aching,  dull  Pain is Non-radiating  Aggravated by overhead activity  Improved with  rest and after some activity  no prior problems with this shoulder in the past  Prior Treatments: Seen in urgent care  Prior studies: X-Ray   Medications tried for pain include: ibuprofen (OTC) does not really help much  Mechanical Symptom history: No Locking    Update:  Shoulders are 100% improved   he is feeling much better and is able to move his arms and lift without any difficulty    Construction, glass/window installation  Fairly sedentary otherwise     PHYSICAL EXAM:  BP (!) 142/94 (BP Location: Left arm, Patient Position: Sitting, BP Cuff Size: Adult)   Pulse 96   Temp 36.6 °C (97.9 °F) (Temporal)   Resp 18   Ht 1.727 m (5' 8\")   Wt 88.5 kg (195 lb)   SpO2 97%   BMI 29.65 kg/m²      well-developed, well-nourished in no apparent distress, alert and oriented x 3.  Gait: normal    Shoulder Exam:    RIGHT Shoulder:  No visible swelling   Range of motion INTACT  Tenderness: Infraspinatus NONTENDER  Empty Can Testing 5/5  Internal Rotation 5/5  External Rotation 5/5  Lift Off Testing 5/5  Impingement testing Ramirez NEGATIVE Neer's testing NEGATIVE    LEFT Shoulder:  No visible swelling   Range of motion INTACT  Tenderness: Infraspinatus NONTENDER  Empty Can Testing 5/5  Internal Rotation 5/5  External Rotation 5/5  Lift Off Testing 5/5  Impingement testing Ramirez NEGATIVE Neer's testing NEGATIVE    Additional Findings: Flexed Posture    1. Calcific tendinitis of both shoulder regions        2. Adhesive capsulitis of left " shoulder          Bao Rowan Pires is complaining of bilateral shoulder pain  present for 3 months  Insidious without injury  Worse in AM and overhead lifting    RIGHT subacromial corticosteroid injection performed (October 16, 2023) HELPED    LEFT shoulder glenohumeral corticosteroid injection performed (October 16, 2023) HELPED    Continue shoulder home exercise program   Recommended physical therapy ([facility] location) so he can be stronger when his corticosteroid effect wears off    Follow-up as needed        9/26/2023 9:44 AM     HISTORY/REASON FOR EXAM:  Atraumatic Pain/Swelling/Deformity.  Right shoulder pain     TECHNIQUE/EXAM DESCRIPTION AND NUMBER OF VIEWS:  3 views of the RIGHT shoulder.     COMPARISON: None     FINDINGS:  There is acromioclavicular joint osteoarthritis.     The glenohumeral joint is normal in appearance.     There is calcification peripheral to the humeral head.     There are no fracture or malalignment.     The visualized lung is clear.     IMPRESSION:     1.  Right acromioclavicular joint osteoarthritis     2.  Calcification peripheral to the right humeral head which could be hydroxyapatite deposition or calcific tendinitis           Exam Ended: 09/26/23  9:56 AM Last Resulted: 09/26/23 10:29 AM                    9/26/2023 9:44 AM     HISTORY/REASON FOR EXAM:  Atraumatic Pain/Swelling/Deformity.  Left shoulder pain     TECHNIQUE/EXAM DESCRIPTION AND NUMBER OF VIEWS:  3 views of the LEFT shoulder.     COMPARISON: None     FINDINGS:  There is acromioclavicular joint osteoarthritis.     The glenohumeral joint is normal in appearance.     There are no fracture or malalignment.     There is calcification lateral to the humeral head.     The visualized lung is clear.     IMPRESSION:     1.  Left acromioclavicular joint osteoarthritis     2.  Calcification peripheral to the humeral head which could be calcific tendinitis or hydroxyapatite deposition           Exam Ended: 09/26/23  9:55 AM  Last Resulted: 09/26/23 10:29 AM           Thank you Jonny Rivero P.A.-C.  for allowing me to participate in caring for your patient.

## 2023-11-29 ENCOUNTER — APPOINTMENT (OUTPATIENT)
Dept: RADIOLOGY | Facility: MEDICAL CENTER | Age: 53
End: 2023-11-29
Attending: STUDENT IN AN ORGANIZED HEALTH CARE EDUCATION/TRAINING PROGRAM
Payer: COMMERCIAL

## 2023-12-08 ENCOUNTER — HOSPITAL ENCOUNTER (OUTPATIENT)
Dept: RADIOLOGY | Facility: MEDICAL CENTER | Age: 53
End: 2023-12-08
Attending: STUDENT IN AN ORGANIZED HEALTH CARE EDUCATION/TRAINING PROGRAM
Payer: COMMERCIAL

## 2023-12-08 DIAGNOSIS — R74.8 ACID PHOSPHATASE ELEVATED: ICD-10-CM

## 2023-12-08 DIAGNOSIS — R94.5 NONSPECIFIC ABNORMAL RESULTS OF LIVER FUNCTION STUDY: ICD-10-CM

## 2023-12-08 DIAGNOSIS — K86.1 CHRONIC PANCREATITIS, UNSPECIFIED PANCREATITIS TYPE (HCC): ICD-10-CM

## 2023-12-08 DIAGNOSIS — E11.9 DIABETES MELLITUS WITHOUT COMPLICATION (HCC): ICD-10-CM

## 2023-12-08 PROCEDURE — 74160 CT ABDOMEN W/CONTRAST: CPT

## 2023-12-08 PROCEDURE — 700117 HCHG RX CONTRAST REV CODE 255

## 2023-12-08 RX ADMIN — IOHEXOL 100 ML: 350 INJECTION, SOLUTION INTRAVENOUS at 14:12

## 2023-12-08 RX ADMIN — IOHEXOL 25 ML: 240 INJECTION, SOLUTION INTRATHECAL; INTRAVASCULAR; INTRAVENOUS; ORAL at 13:15
